# Patient Record
(demographics unavailable — no encounter records)

---

## 2024-10-10 NOTE — PHYSICAL EXAM
[NL] : left tympanic membrane clear, right tympanic membrane clear [Clear Rhinorrhea] : clear rhinorrhea [Erythematous Oropharynx] : nonerythematous oropharynx [Supple] : supple [Clear to Auscultation Bilaterally] : clear to auscultation bilaterally [Wheezing] : no wheezing [Tachypnea] : no tachypnea [Subcostal Retractions] : no subcostal retractions [Suprasternal Retractions] : no suprasternal retractions [Regular Rate and Rhythm] : regular rate and rhythm [Normal S1, S2 audible] : normal S1, S2 audible [Murmurs] : no murmurs [Soft] : soft [Warm, Well Perfused x4] : warm, well perfused x4 [Clear] : clear [de-identified] : NO TEETH

## 2024-10-10 NOTE — HISTORY OF PRESENT ILLNESS
[FreeTextEntry6] : KNOWN PT CONGESTED X A FEW DAYS  IRRITABLE TO SLEEP MILD COUGH NO FEVERS NO SICK CONTACTS

## 2024-10-10 NOTE — REVIEW OF SYSTEMS
[Negative] : Constitutional
Pt to not exceed recommended energy needs to prevent further wt gain/aide in gradual wt loss

## 2024-11-25 NOTE — PHYSICAL EXAM
[Alert] : alert [Playful] : playful [Normocephalic] : normocephalic [Red Reflex] : red reflex bilateral [Conjunctivae with no discharge] : conjunctivae with no discharge [PERRL] : PERRL [EOMI Bilateral] : EOMI bilateral [Normally Placed Ears] : normally placed ears [Auricles Well Formed] : auricles well formed [Clear Tympanic membranes] : clear tympanic membranes [Light reflex present] : light reflex present [Bony landmarks visible] : bony landmarks visible [Nares Patent] : nares patent [Palate Intact] : palate intact [Uvula Midline] : uvula midline [Supple, full passive range of motion] : supple, full passive range of motion [Symmetric Chest Rise] : symmetric chest rise [Clear to Auscultation Bilaterally] : clear to auscultation bilaterally [Regular Rate and Rhythm] : regular rate and rhythm [S1, S2 present] : S1, S2 present [+2 Femoral Pulses] : (+) 2 femoral pulses [Soft] : soft [Bowel Sounds] : bowel sounds present [Normal External Genitalia] : normal external genitalia [No Abnormal Lymph Nodes Palpated] : no abnormal lymph nodes palpated [Symmetric abduction and rotation of hips] : symmetric abduction and rotation of hips [Symmetric Buttocks Creases] : symmetric buttocks creases [Straight] : straight [Cranial Nerves Grossly Intact] : cranial nerves grossly intact [Acute Distress] : no acute distress [Discharge] : no discharge [Palpable Masses] : no palpable masses [Murmurs] : no murmurs [Tender] : nontender [Distended] : nondistended [Hepatomegaly] : no hepatomegaly [Splenomegaly] : no splenomegaly [Clitoromegaly] : no clitoromegaly [Allis Sign] : negative Allis sign [Rash or Lesions] : no rash/lesions

## 2024-11-25 NOTE — DISCUSSION/SUMMARY
[Normal Growth] : growth [Normal Development] : development [None] : No known medical problems [No Elimination Concerns] : elimination [No Feeding Concerns] : feeding [No Skin Concerns] : skin [Normal Sleep Pattern] : sleep [Term Infant] : Term infant [Family Adaptation] : family adaptation [Infant [place]] : infant independence [Feeding Routine] : feeding routine [Safety] : safety [No Medications] : ~He/She~ is not on any medications [Mother] : mother [] : The components of the vaccine(s) to be administered today are listed in the plan of care. The disease(s) for which the vaccine(s) are intended to prevent and the risks have been discussed with the caretaker.  The risks are also included in the appropriate vaccination information statements which have been provided to the patient's caregiver.  The caregiver has given consent to vaccinate. [FreeTextEntry1] : Flu vaccine #1 given and discussed. #2 in 1 month. Balanced nutrition, infant safety discussed. Check up at age 1 year.

## 2024-11-25 NOTE — HISTORY OF PRESENT ILLNESS
[Mother] : mother [Fruit] : fruit [Vegetables] : vegetables [Cereal] : cereal [Meat] : meat [Eggs] : eggs [Peanut] : peanut [Dairy] : dairy [Baby food] : baby food [Finger foods] : finger foods [Water] : water [Normal] : Normal [In Crib] : sleeps in crib [On back] : sleeps on back [Tap water] : Primary Fluoride Source: Tap water [No] : No exposure to electronic nicotine device [Rear facing car seat in  back seat] : Rear facing car seat in  back seat [Carbon Monoxide Detectors] : Carbon monoxide detectors [Smoke Detectors] : Smoke detectors [Up to date] : Up to date [NO] : No [Fish] : no fish [FreeTextEntry7] : none [de-identified] : none [de-identified] : Similac ad radha [de-identified] : Flu vaccine [FreeTextEntry1] : Well visit for this 9 month old who is healthy. NKA. SHe eats most foods now, sleeps well at night, has regular BMs and voids. She has normal growth and age appropriate development.

## 2024-11-25 NOTE — DEVELOPMENTAL MILESTONES
[Uses basic gestures] : uses basic gestures [Says "Francis" or "Mama"] : says "Francis" or "Mama" nonspecifically [Sits well without support] : sits well without support [Transitions between sitting and lying] : transitions between sitting and lying [Balances on hands and knees] : balances on hands and knees [Crawls] : crawls [Picks up small objects with 3 fingers] : picks up small objects with 3 fingers and thumb [Releases objects intentionally] : releases objects intentionally [[place] objects together] : bangs objects together [Yes] : Completed.

## 2024-12-30 NOTE — HISTORY OF PRESENT ILLNESS
[Influenza] : Influenza [FreeTextEntry1] : Here for her 2nd Flu vaccine she comes in good health with no prior adverse nor allergic vaccine reactions.

## 2025-02-26 NOTE — HISTORY OF PRESENT ILLNESS
[Mother] : mother [Formula ___ oz/feed] : [unfilled] oz of formula per feed [Fruit] : fruit [Meat] : meat [Dairy] : dairy [Finger food] : finger food [Table food] : table food [Normal] : Normal [Sippy cup use] : Sippy cup use [No] : Not at  exposure [Car seat in back seat] : Car seat in back seat [Smoke Detectors] : Smoke detectors [Carbon Monoxide Detectors] : Carbon monoxide detectors [NO] : No [FreeTextEntry7] : none [de-identified] : none [LastFluoridetreatment] : not yet [de-identified] : only has her two bottom teeth [de-identified] : ProQuad today [FreeTextEntry1] : Well visit. She is healthy. NKA. She eats all foods well. SHe self feeds. SHe sleeps all night long. SHe has regular BMs and voids. Her growth and development are normal thus far.

## 2025-02-26 NOTE — DEVELOPMENTAL MILESTONES
[None] : none [Looks for hidden objects] : looks for hidden objects [Imitates new gestures] : imitates new gestures [Says "Dad" or "Mom" with meaning] : says "Dad" or "Mom" with meaning [Uses one word other than Mom or] : uses one word other than Mom or Dad or personal names [Follows a verbal command that] : follows a verbal command that includes a gesture [Stands without support] : stands without support [Drops object in a cup] : drops object in a cup [Picks up small object with 2 finger] : picks up small object with 2 finger pincer grasp [Picks up food and eats it] : picks up food and eats it [No] : Not Completed. [Takes first independent] : does not take first independent steps [FreeTextEntry1] : Cruises all over the place

## 2025-02-26 NOTE — DISCUSSION/SUMMARY
[Normal Growth] : growth [Normal Development] : development [None] : No known medical problems [No Elimination Concerns] : elimination [No Feeding Concerns] : feeding [No Skin Concerns] : skin [Normal Sleep Pattern] : sleep [Family Support] : family support [Establishing Routines] : establishing routines [Feeding and Appetite Changes] : feeding and appetite changes [Establishing A Dental Home] : establishing a dental home [Safety] : safety [No Medications] : ~He/She~ is not on any medications [Mother] : mother [] : The components of the vaccine(s) to be administered today are listed in the plan of care. The disease(s) for which the vaccine(s) are intended to prevent and the risks have been discussed with the caretaker.  The risks are also included in the appropriate vaccination information statements which have been provided to the patient's caregiver.  The caregiver has given consent to vaccinate. [FreeTextEntry1] : Proquad given and discussed. Balanced nutrition, giving up the bottle, adequate rest and nutrition and hydration, and safety issues all reviewed. Check up at age 15 months.

## 2025-02-26 NOTE — PHYSICAL EXAM
[Alert] : alert [Normocephalic] : normocephalic [Closed Anterior [place]] : closed anterior fontanelle [Red Reflex] : red reflex bilateral [PERRL] : PERRL [Normally Placed Ears] : normally placed ears [Clear Tympanic membranes] : clear tympanic membranes [Bony landmarks visible] : bony landmarks visible [Nares Patent] : nares patent [Palate Intact] : palate intact [Uvula Midline] : uvula midline [Tooth Eruption] : tooth eruption [Symmetric Chest Rise] : symmetric chest rise [Clear to Auscultation Bilaterally] : clear to auscultation bilaterally [Regular Rate and Rhythm] : regular rate and rhythm [+2 Femoral Pulses] : (+) 2 femoral pulses [Soft] : soft [Bowel Sounds] : normoactive bowel sounds [Normal External Genitalia] : normal external genitalia [Normal Vaginal Introitus] : normal vaginal introitus [No Abnormal Lymph Nodes Palpated] : no abnormal lymph nodes palpated [Symmetric Abduction and Rotation of Hips] : symmetric abduction and rotation of hips [Straight] : straight [Cranial Nerves Grossly Intact] : cranial nerves grossly intact [Acute Distress] : no acute distress [Discharge] : no discharge [Supple, full passive range of motion] : not supple, limited range of motion [Murmurs] : no murmurs [Tender] : nontender [Distended] : nondistended [Hepatomegaly] : no hepatomegaly [Splenomegaly] : no splenomegaly [Clitoromegaly] : no clitoromegaly [Allis Sign] : negative Allis sign [Rash or Lesions] : no rash/lesions

## 2025-04-07 NOTE — HISTORY OF PRESENT ILLNESS
[de-identified] : Cold symptoms and cough with low grade fever [FreeTextEntry6] : Since Friday she has a runny nose with mucus, followed by a low grade fever to 101F and a cough which is mostly at night started Saturday night. Cough sounds wet, mom thinks there was a little wheezing. No one else is sick at home. This office is her medical home.

## 2025-04-07 NOTE — PHYSICAL EXAM
[Acute Distress] : no acute distress [Alert] : alert [Normocephalic] : normocephalic [EOMI] : grossly EOMI [Clear] : right tympanic membrane clear [Erythematous Oropharynx] : nonerythematous oropharynx [Enlarged Tonsils] : tonsils not enlarged [Supple] : supple [FROM] : full passive range of motion [Symmetric Chest Wall] : symmetric chest wall [Clear to Auscultation Bilaterally] : clear to auscultation bilaterally [Regular Rate and Rhythm] : regular rate and rhythm [Normal S1, S2 audible] : normal S1, S2 audible [Murmur] : no murmur [Soft] : soft [Tender] : nontender [Distended] : nondistended [Normal Bowel Sounds] : normal bowel sounds [Hepatosplenomegaly] : no hepatosplenomegaly [NL] : normotonic [FreeTextEntry4] : reddened mucosa with wet opaque mucus present [de-identified] : alert in no distress

## 2025-04-07 NOTE — PHYSICAL EXAM
[Acute Distress] : no acute distress [Alert] : alert [Normocephalic] : normocephalic [EOMI] : grossly EOMI [Clear] : right tympanic membrane clear [Erythematous Oropharynx] : nonerythematous oropharynx [Enlarged Tonsils] : tonsils not enlarged [Supple] : supple [FROM] : full passive range of motion [Symmetric Chest Wall] : symmetric chest wall [Clear to Auscultation Bilaterally] : clear to auscultation bilaterally [Regular Rate and Rhythm] : regular rate and rhythm [Normal S1, S2 audible] : normal S1, S2 audible [Murmur] : no murmur [Soft] : soft [Tender] : nontender [Distended] : nondistended [Normal Bowel Sounds] : normal bowel sounds [Hepatosplenomegaly] : no hepatosplenomegaly [NL] : normotonic [FreeTextEntry4] : reddened mucosa with wet opaque mucus present [de-identified] : alert in no distress

## 2025-04-07 NOTE — DISCUSSION/SUMMARY
[FreeTextEntry1] : URI supportive care measures reviewed. Tylenol dosing reviewed. F/U if not resolving

## 2025-04-07 NOTE — HISTORY OF PRESENT ILLNESS
[de-identified] : Cold symptoms and cough with low grade fever [FreeTextEntry6] : Since Friday she has a runny nose with mucus, followed by a low grade fever to 101F and a cough which is mostly at night started Saturday night. Cough sounds wet, mom thinks there was a little wheezing. No one else is sick at home. This office is her medical home.

## 2025-05-28 NOTE — DISCUSSION/SUMMARY
[Normal Growth] : growth [Normal Development] : development [None] : No known medical problems [No Elimination Concerns] : elimination [No Feeding Concerns] : feeding [No Skin Concerns] : skin [Normal Sleep Pattern] : sleep [Communication and Social Development] : communication and social development [Sleep Routines and Issues] : sleep routines and issues [Temper Tantrums and Discipline] : temper tantrums and discipline [Healthy Teeth] : healthy teeth [Safety] : safety [No Medications] : ~He/She~ is not on any medications [Mother] : mother [] : The components of the vaccine(s) to be administered today are listed in the plan of care. The disease(s) for which the vaccine(s) are intended to prevent and the risks have been discussed with the caretaker.  The risks are also included in the appropriate vaccination information statements which have been provided to the patient's caregiver.  The caregiver has given consent to vaccinate. [FreeTextEntry1] : DTaP, Hib and Prevnar given and discussed. Check up at age 18 months.

## 2025-05-28 NOTE — HISTORY OF PRESENT ILLNESS
[Mother] : mother [Cow's milk (Ounces per day ___)] : consumes [unfilled] oz of cow's milk per day [Fruit] : fruit [Vegetables] : vegetables [Meat] : meat [Cereal] : cereal [Eggs] : eggs [Finger Foods] : finger foods [Table food] : table food [Normal] : Normal [Sippy cup use] : Sippy cup use [No] : Patient does not go to dentist yearly [Tap water] : Primary Fluoride Source: Tap water [Temper Tantrums] : Temper tantrums [Car seat in back seat] : Car seat in back seat [Carbon Monoxide Detectors] : Carbon monoxide detectors [Smoke Detectors] : Smoke detectors [NO] : No [FreeTextEntry7] : none [de-identified] : none [LastFluorideTreatment] : to be scheduled [de-identified] : DTaP, Hib and Prevnar [FreeTextEntry1] : Well visit. SHe is healthy. SHe eats well, sleeps well, has regular BMs and voids. SHe is friendly and social. SHe is a little bossy.  Her growth is excellent and her development is age appropriate.

## 2025-05-28 NOTE — DEVELOPMENTAL MILESTONES
[Normal Development] : Normal Development [None] : none [Imitates scribbling] : imitates scribbling [Drinks from cup with little] : drinks from cup with little spilling [Points to ask for something] : points to ask for something or to get help [Uses 3 words other than names] : uses 3 words other than names [Speaks in sounds that seem like] : speaks in sounds that seem like an unknown language [Follows directions that do not] : follows direction that do not include a gesture [Looks when parent says,] : looks when parent says, "Where is...?" [Squats to  objects] : squats to  objects [Crawls up a few steps] : crawls up a few steps [Begins to run] : begins to run [Makes sinai with crayon] : makes sinai with [place] [Drops object into and takes object] : drops object into and takes object out of container